# Patient Record
Sex: FEMALE | Race: WHITE | NOT HISPANIC OR LATINO | Employment: OTHER | URBAN - METROPOLITAN AREA
[De-identification: names, ages, dates, MRNs, and addresses within clinical notes are randomized per-mention and may not be internally consistent; named-entity substitution may affect disease eponyms.]

---

## 2018-06-01 ENCOUNTER — ESTABLISHED COMPREHENSIVE EXAM (OUTPATIENT)
Dept: URBAN - METROPOLITAN AREA CLINIC 35 | Facility: CLINIC | Age: 68
End: 2018-06-01

## 2018-06-01 DIAGNOSIS — Z96.1: ICD-10-CM

## 2018-06-01 DIAGNOSIS — H43.812: ICD-10-CM

## 2018-06-01 PROCEDURE — 1036F TOBACCO NON-USER: CPT

## 2018-06-01 PROCEDURE — G8427 DOCREV CUR MEDS BY ELIG CLIN: HCPCS

## 2018-06-01 PROCEDURE — 92014 COMPRE OPH EXAM EST PT 1/>: CPT

## 2018-06-01 PROCEDURE — 92015 DETERMINE REFRACTIVE STATE: CPT

## 2018-06-01 PROCEDURE — G8756 NO BP MEASURE DOC: HCPCS

## 2018-06-01 ASSESSMENT — VISUAL ACUITY
OS_SC: J8
OD_SC: J4
OD_SC: 20/20
OS_SC: 20/40

## 2018-06-01 ASSESSMENT — TONOMETRY
OS_IOP_MMHG: 14
OD_IOP_MMHG: 10

## 2018-08-10 NOTE — PATIENT DISCUSSION
Per pt history. States she saw retina specialist 3-4 years ago and had MRI. Did not need to take any meds. Was told to monitor. Stable. If any increase in HA, blurred vision, RTC. Lifestyle factors.

## 2020-01-06 ENCOUNTER — ESTABLISHED COMPREHENSIVE EXAM (OUTPATIENT)
Dept: URBAN - METROPOLITAN AREA CLINIC 35 | Facility: CLINIC | Age: 70
End: 2020-01-06

## 2020-01-06 DIAGNOSIS — H35.371: ICD-10-CM

## 2020-01-06 DIAGNOSIS — H43.812: ICD-10-CM

## 2020-01-06 DIAGNOSIS — H26.40: ICD-10-CM

## 2020-01-06 PROCEDURE — 92015 DETERMINE REFRACTIVE STATE: CPT

## 2020-01-06 PROCEDURE — 92014 COMPRE OPH EXAM EST PT 1/>: CPT

## 2020-01-06 ASSESSMENT — VISUAL ACUITY
OD_SC: 20/25
OS_SC: 20/25

## 2020-01-06 ASSESSMENT — TONOMETRY
OD_IOP_MMHG: 16
OS_IOP_MMHG: 16

## 2020-01-16 ENCOUNTER — H&P (OUTPATIENT)
Dept: URBAN - METROPOLITAN AREA SURGERY 14 | Facility: SURGERY | Age: 70
End: 2020-01-16

## 2020-01-16 ENCOUNTER — SURGERY/PROCEDURE (OUTPATIENT)
Dept: URBAN - METROPOLITAN AREA SURGERY 14 | Facility: SURGERY | Age: 70
End: 2020-01-16

## 2020-01-16 DIAGNOSIS — H26.493: ICD-10-CM

## 2020-01-16 PROCEDURE — 99499 UNLISTED E&M SERVICE: CPT

## 2020-01-16 PROCEDURE — 6682150 YAG CAPSULOTOMY

## 2020-01-27 ENCOUNTER — YAG POST-OP (OUTPATIENT)
Dept: URBAN - METROPOLITAN AREA CLINIC 35 | Facility: CLINIC | Age: 70
End: 2020-01-27

## 2020-01-27 DIAGNOSIS — Z98.890: ICD-10-CM

## 2020-01-27 PROCEDURE — 99024 POSTOP FOLLOW-UP VISIT: CPT

## 2020-01-27 ASSESSMENT — TONOMETRY
OS_IOP_MMHG: 11
OD_IOP_MMHG: 12

## 2020-01-27 ASSESSMENT — VISUAL ACUITY
OD_SC: 20/25-1
OS_SC: 20/25+2

## 2021-10-12 ENCOUNTER — ESTABLISHED COMPREHENSIVE EXAM (OUTPATIENT)
Dept: URBAN - METROPOLITAN AREA CLINIC 35 | Facility: CLINIC | Age: 71
End: 2021-10-12

## 2021-10-12 DIAGNOSIS — H43.812: ICD-10-CM

## 2021-10-12 PROCEDURE — 92014 COMPRE OPH EXAM EST PT 1/>: CPT

## 2021-10-12 PROCEDURE — 92015 DETERMINE REFRACTIVE STATE: CPT

## 2021-10-12 ASSESSMENT — VISUAL ACUITY
OS_SC: 20/25-1
OD_SC: 20/30-2
OD_SC: J4
OS_SC: J12

## 2021-10-12 ASSESSMENT — TONOMETRY
OD_IOP_MMHG: 12
OS_IOP_MMHG: 12

## 2023-01-10 ENCOUNTER — COMPREHENSIVE EXAM (OUTPATIENT)
Dept: URBAN - METROPOLITAN AREA CLINIC 35 | Facility: CLINIC | Age: 73
End: 2023-01-10

## 2023-01-10 DIAGNOSIS — Z96.1: ICD-10-CM

## 2023-01-10 DIAGNOSIS — H43.812: ICD-10-CM

## 2023-01-10 PROCEDURE — 92014 COMPRE OPH EXAM EST PT 1/>: CPT

## 2023-01-10 ASSESSMENT — TONOMETRY
OS_IOP_MMHG: 12
OD_IOP_MMHG: 12

## 2023-01-10 ASSESSMENT — VISUAL ACUITY
OD_SC: 20/20-2
OS_SC: 20/25+2
OS_CC: J1
OD_CC: J1
OS_SC: J8
OD_SC: J6

## 2025-03-13 ENCOUNTER — COMPREHENSIVE EXAM (OUTPATIENT)
Age: 75
End: 2025-03-13

## 2025-03-13 DIAGNOSIS — H43.812: ICD-10-CM

## 2025-03-13 DIAGNOSIS — Z96.1: ICD-10-CM

## 2025-03-13 DIAGNOSIS — H35.371: ICD-10-CM

## 2025-03-13 DIAGNOSIS — H04.123: ICD-10-CM

## 2025-03-13 PROCEDURE — 92014 COMPRE OPH EXAM EST PT 1/>: CPT
